# Patient Record
Sex: FEMALE | Race: BLACK OR AFRICAN AMERICAN | NOT HISPANIC OR LATINO | URBAN - METROPOLITAN AREA
[De-identification: names, ages, dates, MRNs, and addresses within clinical notes are randomized per-mention and may not be internally consistent; named-entity substitution may affect disease eponyms.]

---
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
## 2018-12-03 NOTE — PATIENT DISCUSSION
(H25.13) Age-related nuclear cataract, bilateral - Assesment : Examination revealed cataract. Mild symptoms. CL wearer. - Plan : Monitor for changes. Advised patient to call our office with decreased vision or increased symptoms. Updated CL Trials given today. Pt to try trials and follow with Antwon Salazar or Arnaldo Barragan for further changes.

## 2018-12-03 NOTE — PATIENT DISCUSSION
(H40.013) Open angle with borderline findings, low risk, bilateral - Assesment : Examination revealed suspicion for Open Angle Glaucoma. OCT ONH performed: stable today. - Plan : Monitor for IOP and NFL changes with visits and OCTs. RTC in 1 year for CL Exam and OCT ONH, sooner if problems or changes.

## 2019-03-14 ENCOUNTER — IMPORTED ENCOUNTER (OUTPATIENT)
Dept: URBAN - METROPOLITAN AREA CLINIC 1 | Facility: CLINIC | Age: 19
End: 2019-03-14

## 2019-03-14 PROBLEM — H44.23: Noted: 2019-03-14

## 2019-03-14 PROCEDURE — S0621 ROUTINE OPHTHALMOLOGICAL EXA: HCPCS

## 2019-03-14 NOTE — PATIENT DISCUSSION
1. Myopia: Rx was given for correction if indicated and requested. Daily CTL trials given to help improve comfortReturn for an appointment in 1 week cc with Dr. Gm Walker.

## 2019-03-14 NOTE — PATIENT DISCUSSION
Daily CTL trials given to help improve comfortReturn for an appointment in 1 week cc with Dr. Isac Vidal.

## 2019-03-26 ENCOUNTER — IMPORTED ENCOUNTER (OUTPATIENT)
Dept: URBAN - METROPOLITAN AREA CLINIC 1 | Facility: CLINIC | Age: 19
End: 2019-03-26

## 2019-10-23 NOTE — PATIENT DISCUSSION
POAG OU:  VISUAL FIELD SHOWS MILD PROGRESSION OF VISION LOSS. DISCUSSED STARTING DROPS. WILL PRESCRIBE LUMIGAN OU QHS.

## 2020-03-09 ENCOUNTER — IMPORTED ENCOUNTER (OUTPATIENT)
Dept: URBAN - METROPOLITAN AREA CLINIC 1 | Facility: CLINIC | Age: 20
End: 2020-03-09

## 2020-03-09 PROBLEM — H52.13: Noted: 2020-03-09

## 2020-03-09 PROCEDURE — S0621 ROUTINE OPHTHALMOLOGICAL EXA: HCPCS

## 2020-03-09 NOTE — PATIENT DISCUSSION
1. Myopia: Rx was given for correction if indicated and requested. 2. Conjunctiva Pigmentation- Stable 3. Floaters OU- RD precautions. CTL finalized todayReturn for an appointment in 1 year 40/CC with Dr. Timothy Crenshaw.

## 2021-04-26 ENCOUNTER — IMPORTED ENCOUNTER (OUTPATIENT)
Dept: URBAN - METROPOLITAN AREA CLINIC 1 | Facility: CLINIC | Age: 21
End: 2021-04-26

## 2021-04-26 PROBLEM — H52.223: Noted: 2021-04-26

## 2021-04-26 PROBLEM — H44.23: Noted: 2021-04-26

## 2021-04-26 PROCEDURE — S0621 ROUTINE OPHTHALMOLOGICAL EXA: HCPCS

## 2021-04-26 NOTE — PATIENT DISCUSSION
1. Myopia w/ Astigmatism OU -- Rx was given for correction if indicated and requested. 2. Conjunctival Pigment OD -- Stable. Observe for change. 3.  Vitreous Floaters OU -- Stable. RD Precautions. Finalized CTL MRx todayReturn for an appointment in 1 year 40/CC with Dr. Reid Valenzuela.

## 2022-04-02 ASSESSMENT — VISUAL ACUITY
OS_CC: J1
OS_SC: 20/40
OD_SC: 20/20
OS_SC: 20/20
OD_SC: 20/20
OS_SC: 20/30-1
OS_SC: 20/20-2
OU_CC: 20/20
OD_CC: 20/20
OD_SC: 20/25
OS_SC: 20/25-2
OD_SC: 20/30+1
OD_CC: J1
OS_SC: 20/25
OD_SC: 20/25
OD_SC: 20/20
OS_CC: 20/20-1

## 2022-04-02 ASSESSMENT — TONOMETRY
OD_IOP_MMHG: 16
OS_IOP_MMHG: 17
OD_IOP_MMHG: 17
OS_IOP_MMHG: 16

## 2022-04-13 ENCOUNTER — EMERGENCY VISIT (OUTPATIENT)
Dept: URBAN - METROPOLITAN AREA CLINIC 1 | Facility: CLINIC | Age: 22
End: 2022-04-13

## 2022-04-13 DIAGNOSIS — H43.393: ICD-10-CM

## 2022-04-13 PROCEDURE — 92012 INTRM OPH EXAM EST PATIENT: CPT

## 2022-04-13 ASSESSMENT — VISUAL ACUITY
OS_CC: 20/20
OD_CC: 20/20

## 2022-04-13 ASSESSMENT — TONOMETRY
OD_IOP_MMHG: 13
OS_IOP_MMHG: 12

## 2022-04-13 NOTE — PATIENT DISCUSSION
No retinal tears holes or detachments today. Condition discussed and RD precautions discussed with patient.

## 2022-08-08 ENCOUNTER — ESTABLISHED PATIENT (OUTPATIENT)
Dept: URBAN - METROPOLITAN AREA CLINIC 1 | Facility: CLINIC | Age: 22
End: 2022-08-08

## 2022-08-08 DIAGNOSIS — H52.223: ICD-10-CM

## 2022-08-08 DIAGNOSIS — H44.23: ICD-10-CM

## 2022-08-08 PROCEDURE — 92310 CONTACT LENS FITTING OU: CPT

## 2022-08-08 PROCEDURE — 92014 COMPRE OPH EXAM EST PT 1/>: CPT

## 2022-08-08 ASSESSMENT — VISUAL ACUITY
OS_CC: J1+
OD_CC: 20/20
OS_CC: 20/25+2
OD_CC: J1+

## 2022-08-08 ASSESSMENT — TONOMETRY
OD_IOP_MMHG: 14
OS_IOP_MMHG: 14

## 2022-10-04 NOTE — ANESTHESIA PREPROCEDURE EVALUATION
Anesthetic History   No history of anesthetic complications            Review of Systems / Medical History  Patient summary reviewed and pertinent labs reviewed    Pulmonary  Within defined limits                 Neuro/Psych   Within defined limits           Cardiovascular  Within defined limits                Exercise tolerance: >4 METS     GI/Hepatic/Renal  Within defined limits              Endo/Other  Within defined limits           Other Findings   Comments:   Risk Factors for Postoperative nausea/vomiting:       History of postoperative nausea/vomiting? NO       Female? YES       Motion sickness? NO       Intended opioid administration for postoperative analgesia? YES      Smoking Abstinence  Current Smoker? NO  Elective Surgery? YES  Seen preoperatively by anesthesiologist or proxy prior to day of surgery? YES  Pt abstained from smoking 24 hours prior to anesthesia?  YES               Physical Exam    Airway  Mallampati: II  TM Distance: 4 - 6 cm  Neck ROM: normal range of motion   Mouth opening: Normal     Cardiovascular    Rhythm: regular  Rate: normal         Dental    Dentition: Poor dentition     Pulmonary  Breath sounds clear to auscultation               Abdominal  GI exam deferred       Other Findings            Anesthetic Plan    ASA: 2  Anesthesia type: general          Induction: Intravenous  Anesthetic plan and risks discussed with: Patient Melolabial Interpolation Flap Text: A decision was made to reconstruct the defect utilizing an interpolation axial flap and a staged reconstruction.  A telfa template was made of the defect.  This telfa template was then used to outline the melolabial interpolation flap.  The donor area for the pedicle flap was then injected with anesthesia.  The flap was excised through the skin and subcutaneous tissue down to the layer of the underlying musculature.  The pedicle flap was carefully excised within this deep plane to maintain its blood supply.  The edges of the donor site were undermined.   The donor site was closed in a primary fashion.  The pedicle was then rotated into position and sutured.  Once the tube was sutured into place, adequate blood supply was confirmed with blanching and refill.  The pedicle was then wrapped with xeroform gauze and dressed appropriately with a telfa and gauze bandage to ensure continued blood supply and protect the attached pedicle.

## 2024-01-10 ENCOUNTER — COMPREHENSIVE EXAM (OUTPATIENT)
Dept: URBAN - METROPOLITAN AREA CLINIC 1 | Facility: CLINIC | Age: 24
End: 2024-01-10

## 2024-01-10 DIAGNOSIS — Z46.0: ICD-10-CM

## 2024-01-10 DIAGNOSIS — H52.223: ICD-10-CM

## 2024-01-10 DIAGNOSIS — H44.23: ICD-10-CM

## 2024-01-10 DIAGNOSIS — Z01.00: ICD-10-CM

## 2024-01-10 PROCEDURE — 92015 DETERMINE REFRACTIVE STATE: CPT

## 2024-01-10 PROCEDURE — 92310-2 LEVEL 2 CONTACT LENS MANAGEMENT

## 2024-01-10 PROCEDURE — 92014 COMPRE OPH EXAM EST PT 1/>: CPT

## 2024-01-10 ASSESSMENT — VISUAL ACUITY
OD_CC: 20/20-1
OS_CC: 20/25
OD_CC: J1+
OS_CC: J1+

## 2024-01-10 ASSESSMENT — TONOMETRY
OD_IOP_MMHG: 15
OS_IOP_MMHG: 15

## (undated) DEVICE — DEPAUL MINOR HEAD AND NECK: Brand: MEDLINE INDUSTRIES, INC.

## (undated) DEVICE — SYR 10ML CTRL LR LCK NSAF LF --

## (undated) DEVICE — SPONGE GZ W4XL4IN COT 12 PLY TYP VII WVN C FLD DSGN

## (undated) DEVICE — PACKING GZ W2INXL6FT WVN COT VAG RADPQ

## (undated) DEVICE — ARGYLE FRAZIER SURGICAL SUCTION INSTRUMENT 12 FR/CH (4.0 MM): Brand: ARGYLE

## (undated) DEVICE — SOLUTION IV 1000ML 0.9% SOD CHL

## (undated) DEVICE — SUTURE VCRL SZ 3-0 L27IN ABSRB UD L26MM SH 1/2 CIR J416H

## (undated) DEVICE — KENDALL SCD EXPRESS SLEEVES, KNEE LENGTH, MEDIUM: Brand: KENDALL SCD

## (undated) DEVICE — CATHETER IV 10GA L3IN OD3.3-3.5MM ID2.64-2.74MM BRN FEP

## (undated) DEVICE — TAPE UMB 1/8X30IN MP COT WHT --

## (undated) DEVICE — MEDI-VAC SUCTION HANDLE REGULAR CAPACITY: Brand: CARDINAL HEALTH

## (undated) DEVICE — STERILE LATEX POWDER-FREE SURGICAL GLOVESWITH NITRILE COATING: Brand: PROTEXIS

## (undated) DEVICE — STERILE POLYISOPRENE POWDER-FREE SURGICAL GLOVES: Brand: PROTEXIS

## (undated) DEVICE — NEEDLE HYPO 25GA L1.5IN BLU POLYPR HUB S STL REG BVL STR

## (undated) DEVICE — SYRINGE MED 20ML STD CLR PLAS LUERLOCK TIP N CTRL DISP

## (undated) DEVICE — MAGNETIC INSTRUMENT PAD 10" X 16"; MEDIUM; DISPOSABLE: Brand: CARDINAL HEALTH

## (undated) DEVICE — INTENDED FOR TISSUE SEPARATION, AND OTHER PROCEDURES THAT REQUIRE A SHARP SURGICAL BLADE TO PUNCTURE OR CUT.: Brand: BARD-PARKER ® CARBON RIB-BACK BLADES

## (undated) DEVICE — SYR 50ML LR LCK 1ML GRAD NSAF --

## (undated) DEVICE — Z DISCONTINUED USE 2425483 (LOW STOCK PER MEDLINE) TAPE UMB L18IN DIA1/8IN WHT COT NONABSORBABLE W/O NDL FOR

## (undated) DEVICE — CATHETER IV 14GA L1.25IN ORNG POLY SFTY SYS FULL ENCASED

## (undated) DEVICE — SURGIFOAM SPNG SZ 12-7